# Patient Record
Sex: MALE | Race: OTHER | HISPANIC OR LATINO | URBAN - METROPOLITAN AREA
[De-identification: names, ages, dates, MRNs, and addresses within clinical notes are randomized per-mention and may not be internally consistent; named-entity substitution may affect disease eponyms.]

---

## 2020-08-20 ENCOUNTER — EMERGENCY (EMERGENCY)
Facility: HOSPITAL | Age: 35
LOS: 1 days | Discharge: HOME | End: 2020-08-20
Attending: STUDENT IN AN ORGANIZED HEALTH CARE EDUCATION/TRAINING PROGRAM
Payer: COMMERCIAL

## 2020-08-20 VITALS
HEIGHT: 69 IN | SYSTOLIC BLOOD PRESSURE: 135 MMHG | RESPIRATION RATE: 16 BRPM | HEART RATE: 79 BPM | OXYGEN SATURATION: 99 % | WEIGHT: 184.97 LBS | DIASTOLIC BLOOD PRESSURE: 81 MMHG | TEMPERATURE: 99 F

## 2020-08-20 VITALS
TEMPERATURE: 99 F | DIASTOLIC BLOOD PRESSURE: 86 MMHG | SYSTOLIC BLOOD PRESSURE: 136 MMHG | OXYGEN SATURATION: 100 % | HEART RATE: 88 BPM | RESPIRATION RATE: 16 BRPM

## 2020-08-20 DIAGNOSIS — Z98.89 OTHER SPECIFIED POSTPROCEDURAL STATES: Chronic | ICD-10-CM

## 2020-08-20 PROCEDURE — 73090 X-RAY EXAM OF FOREARM: CPT | Mod: 26,LT

## 2020-08-20 PROCEDURE — 73090 X-RAY EXAM OF FOREARM: CPT

## 2020-08-20 PROCEDURE — 73080 X-RAY EXAM OF ELBOW: CPT | Mod: 26,LT

## 2020-08-20 PROCEDURE — 73060 X-RAY EXAM OF HUMERUS: CPT

## 2020-08-20 PROCEDURE — 99284 EMERGENCY DEPT VISIT MOD MDM: CPT

## 2020-08-20 PROCEDURE — 73080 X-RAY EXAM OF ELBOW: CPT

## 2020-08-20 PROCEDURE — 73060 X-RAY EXAM OF HUMERUS: CPT | Mod: 26,LT

## 2020-08-20 RX ORDER — IBUPROFEN 200 MG
600 TABLET ORAL ONCE
Refills: 0 | Status: COMPLETED | OUTPATIENT
Start: 2020-08-20 | End: 2020-08-20

## 2020-08-20 RX ADMIN — Medication 600 MILLIGRAM(S): at 12:45

## 2020-08-20 NOTE — ED ADULT NURSE NOTE - CHPI ED NUR SYMPTOMS NEG
no stiffness/no fever/no numbness/no deformity/no difficulty bearing weight/no tingling/no bruising/no back pain/no abrasion

## 2020-08-20 NOTE — ED PROVIDER NOTE - CARE PROVIDER_API CALL
Lico Brown  ORTHOPAEDIC SURGERY  [address], [address]  [place] [address]  Phone: [phone]  Fax: [phone]  Follow Up Time:

## 2020-08-20 NOTE — ED PROVIDER NOTE - NSFOLLOWUPINSTRUCTIONS_ED_ALL_ED_FT
You were evaluated in the emergency department for arm pain.    There is concern for an injury to your bicep tendon.    You need to follow up with the orthopedist next week. Please call today to make an appointment to be seen. You were provided with information for Dr. Brown as well as the orthopedic clinic.    Please take Tylenol 650 mg every 4 hours as needed for pain. Please do not exceed more than 4,000mg of Tylenol in a day     Please take Motrin 600mg by mouth every 6 hours as needed for pain. Please take this medication with food.     You were provided with a copy of your test results.    Please wear your sling for comfort, but you should not wear it all the time as this can cause shoulder mobility problems.

## 2020-08-20 NOTE — ED PROVIDER NOTE - PHYSICAL EXAMINATION
Gen: NAD, AOx3  Head: NCAT  HEENT: PERRL, oral mucosa moist, normal conjunctiva  Lung: CTAB, no respiratory distress  CV: rrr, no murmurs, Normal perfusion  MSK: mild edema of left forearm, pain with extension of elbow otherwise full ROM of LUE, +equal radial pulses b/l  Neuro: No focal neurologic deficits, normal gait  Skin: No rash   Psych: normal affect

## 2020-08-20 NOTE — ED PROVIDER NOTE - NSFOLLOWUPCLINICS_GEN_ALL_ED_FT
[facility]  Orthopedic Surgery  [address], 3rd & 4th [place] [address]  Phone: [phone]  Fax:   Follow Up Time:

## 2020-08-20 NOTE — ED PROVIDER NOTE - OBJECTIVE STATEMENT
34M no pmh p/w left arm pain.  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  Feels like he tore something in his arm.  Pt has pain trying to fully extend the elbow.  Denies numbness/tingling.  No head trauma or other injuries.

## 2020-08-20 NOTE — ED PROVIDER NOTE - PROGRESS NOTE DETAILS
Dr. Bhupendra Ruiz, PGY-3: xrays w/o obvious deformity. Although limited 2/2 to pain pt does still have full ROM. Will DC w/ ortho follow up for workup of possible bicep tendon rupture vs. partial rupture. Explained plan to pt and he is amenable to this. Return precautions provided, pt verbalized understanding. Ready for DC.

## 2020-08-20 NOTE — ED PROVIDER NOTE - CLINICAL SUMMARY MEDICAL DECISION MAKING FREE TEXT BOX
36M p/w LUE arm pain after feeling like he "tore" something from lifting heavy dock.  Pain with elbow extension and slight edema of forearm.  Unclear if injury to bicep or forearm musculature.  Will check xray to eval for fx, give pain control and likely out patient ortho/sports follow up.  - Kathia Gutiérrez, DO 36M p/w LUE arm pain after feeling like he "tore" something from lifting heavy dock.  Pain with elbow extension and slight edema of forearm.  Likely injury of part of bicep  Will check xray to eval for fx, give pain control and likely out patient ortho/sports follow up.  - Kathia Gutiérrez, DO

## 2020-08-20 NOTE — ED ADULT NURSE NOTE - OBJECTIVE STATEMENT
35 y/o M pw c/o left bicep pain. States was working on a dock and lifted a heavy item and felt a snap in his bicep and states he feels like he tore something. denies any numbness/tingling, reports minor weakness in left arm.

## 2020-08-21 ENCOUNTER — APPOINTMENT (OUTPATIENT)
Dept: ORTHOPEDIC SURGERY | Facility: CLINIC | Age: 35
End: 2020-08-21
Payer: COMMERCIAL

## 2020-08-21 VITALS — TEMPERATURE: 97.2 F

## 2020-08-21 VITALS
HEIGHT: 71 IN | HEART RATE: 69 BPM | DIASTOLIC BLOOD PRESSURE: 67 MMHG | SYSTOLIC BLOOD PRESSURE: 118 MMHG | WEIGHT: 185 LBS | BODY MASS INDEX: 25.9 KG/M2

## 2020-08-21 DIAGNOSIS — Z80.42 FAMILY HISTORY OF MALIGNANT NEOPLASM OF PROSTATE: ICD-10-CM

## 2020-08-21 DIAGNOSIS — Z78.9 OTHER SPECIFIED HEALTH STATUS: ICD-10-CM

## 2020-08-21 DIAGNOSIS — Z60.2 PROBLEMS RELATED TO LIVING ALONE: ICD-10-CM

## 2020-08-21 PROCEDURE — 99203 OFFICE O/P NEW LOW 30 MIN: CPT

## 2020-08-21 SDOH — SOCIAL STABILITY - SOCIAL INSECURITY: PROBLEMS RELATED TO LIVING ALONE: Z60.2

## 2020-08-24 ENCOUNTER — APPOINTMENT (OUTPATIENT)
Dept: ORTHOPEDIC SURGERY | Facility: CLINIC | Age: 35
End: 2020-08-24
Payer: COMMERCIAL

## 2020-08-24 VITALS — SYSTOLIC BLOOD PRESSURE: 122 MMHG | HEART RATE: 70 BPM | DIASTOLIC BLOOD PRESSURE: 67 MMHG

## 2020-08-24 VITALS — TEMPERATURE: 97.3 F

## 2020-08-24 PROCEDURE — 99214 OFFICE O/P EST MOD 30 MIN: CPT

## 2020-08-26 ENCOUNTER — RESULT REVIEW (OUTPATIENT)
Age: 35
End: 2020-08-26

## 2020-08-26 ENCOUNTER — APPOINTMENT (OUTPATIENT)
Dept: MRI IMAGING | Facility: CLINIC | Age: 35
End: 2020-08-26
Payer: COMMERCIAL

## 2020-08-26 ENCOUNTER — OUTPATIENT (OUTPATIENT)
Dept: OUTPATIENT SERVICES | Facility: HOSPITAL | Age: 35
LOS: 1 days | End: 2020-08-26
Payer: COMMERCIAL

## 2020-08-26 DIAGNOSIS — Z98.89 OTHER SPECIFIED POSTPROCEDURAL STATES: Chronic | ICD-10-CM

## 2020-08-26 DIAGNOSIS — Z01.818 ENCOUNTER FOR OTHER PREPROCEDURAL EXAMINATION: ICD-10-CM

## 2020-08-26 DIAGNOSIS — Z00.8 ENCOUNTER FOR OTHER GENERAL EXAMINATION: ICD-10-CM

## 2020-08-26 PROCEDURE — 73221 MRI JOINT UPR EXTREM W/O DYE: CPT

## 2020-08-26 PROCEDURE — 73221 MRI JOINT UPR EXTREM W/O DYE: CPT | Mod: 26,LT

## 2020-08-26 NOTE — PHYSICAL EXAM
[de-identified] : ·	Oriented to time, place, person\par ·	Mood: Normal\par ·	Affect: Normal\par ·	Appearance: Healthy, well appearing, no acute distress.\par ·	Gait: Normal\par ·	Assistive Devices: None\par \par Left elbow exam\par \par ·	Skin: Clean, dry, intact.  Mild medial ecchymosis.  Mild antecubital swelling. No palpable joint effusion.\par ·	ROM: Limited terminal extension, full supination/pronation.  \par ·	Painful ROM: Pain with supination/extension\par ·	Tenderness: No medial epicondyle pain. No lateral epicondyle pain. No olecranon pain. No pain at radial head.  Tender to palpation of the distal biceps.\par ·	Strength: 4/5 elbow flexion, 5/5 elbow extension, 3/5 supination, 5/5 pronation\par ·	Stability: Stable to vaus/valgus stress\par ·	Vasc: 2+ radial pulse, <2s cap refill\par ·	Sensation: In tact to light touch throughout\par ·	Neuro: Negative tinels at ulnar canal, AIN/PIN/Ulnar nerve in tact to motor/sensation.\par  [de-identified] : Images were reviewed from ER dated 8.20.20. \par \par Multiple images left elbow showed no evidence of bony injury, or rogelio dislocation. There is no underlying degenerative arthritic change seen. Overall alignment is maintained. Otherwise unremarkable.

## 2020-08-26 NOTE — HISTORY OF PRESENT ILLNESS
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 He states that his friend lost his  and he was holding all the weight when he felt a pop in his elbow. He was evaluated in the ED at [facility] where xrays were negative for fracture. He followed up with sudarshan Carrasco with left distal biceps rupture and referred here for further treatment options.  He presents in a sling. He is not taking medication for this problem. \par \par The patient's past medical history, past surgical history, medications and allergies were reviewed by me today with the patient and documented accordingly. In addition, the patient's family and social history, which were noncontributory to this visit, were reviewed also.

## 2020-08-26 NOTE — DISCUSSION/SUMMARY
[de-identified] : 34-year-old male with left distal biceps tendon injury\par \par Patient presents with an acute injury to the left elbow consistent with complete distal biceps tendon avulsion. Discussed risk factors associated including hypervascularity of the tendon, intrinsic degeneration, and possible mechanical impingement. Mechanism of injury typically includes eccentric tension from a flexed to extended elbow position.\par \par History is consistent with an acute injury to the antecubital fossa, and physical exam suggests deformity to the biceps, loss of supination/flexion strength and positive hook test. I discussed the utility of MRI to distinguish degree of tendon retraction, and degree of tearing.\par \par Discussed treatment alternatives including nonoperative versus operative intervention. Acute operative intervention would repair the tendon back to the radial tuberosity to improve long-term function/strength. Nonoperative management would provide possible functional deficits including loss of 50% of supination strength, 30% of elbow flexion strength and possibly some  strength.\par \par The patient is electing for potential surgical management, so an MRI will be obtained to confirm full-thickness injury.  We will further discuss surgical treatment following MRI.

## 2020-08-28 ENCOUNTER — APPOINTMENT (OUTPATIENT)
Dept: DISASTER EMERGENCY | Facility: CLINIC | Age: 35
End: 2020-08-28

## 2020-08-29 LAB — SARS-COV-2 N GENE NPH QL NAA+PROBE: NOT DETECTED

## 2020-08-31 ENCOUNTER — OUTPATIENT (OUTPATIENT)
Dept: OUTPATIENT SERVICES | Facility: HOSPITAL | Age: 35
LOS: 1 days | End: 2020-08-31

## 2020-08-31 VITALS
HEIGHT: 70 IN | DIASTOLIC BLOOD PRESSURE: 70 MMHG | WEIGHT: 179.9 LBS | OXYGEN SATURATION: 98 % | RESPIRATION RATE: 16 BRPM | TEMPERATURE: 98 F | SYSTOLIC BLOOD PRESSURE: 100 MMHG | HEART RATE: 87 BPM

## 2020-08-31 DIAGNOSIS — Z98.89 OTHER SPECIFIED POSTPROCEDURAL STATES: Chronic | ICD-10-CM

## 2020-08-31 DIAGNOSIS — S46.212A STRAIN OF MUSCLE, FASCIA AND TENDON OF OTHER PARTS OF BICEPS, LEFT ARM, INITIAL ENCOUNTER: ICD-10-CM

## 2020-08-31 DIAGNOSIS — K21.9 GASTRO-ESOPHAGEAL REFLUX DISEASE WITHOUT ESOPHAGITIS: ICD-10-CM

## 2020-08-31 DIAGNOSIS — M54.2 CERVICALGIA: ICD-10-CM

## 2020-08-31 DIAGNOSIS — S46.219A STRAIN OF MUSCLE, FASCIA AND TENDON OF OTHER PARTS OF BICEPS, UNSPECIFIED ARM, INITIAL ENCOUNTER: ICD-10-CM

## 2020-08-31 LAB
ANION GAP SERPL CALC-SCNC: 16 MMO/L — HIGH (ref 7–14)
BUN SERPL-MCNC: 17 MG/DL — SIGNIFICANT CHANGE UP (ref 7–23)
CALCIUM SERPL-MCNC: 9.9 MG/DL — SIGNIFICANT CHANGE UP (ref 8.4–10.5)
CHLORIDE SERPL-SCNC: 101 MMOL/L — SIGNIFICANT CHANGE UP (ref 98–107)
CO2 SERPL-SCNC: 24 MMOL/L — SIGNIFICANT CHANGE UP (ref 22–31)
CREAT SERPL-MCNC: 1.03 MG/DL — SIGNIFICANT CHANGE UP (ref 0.5–1.3)
GLUCOSE SERPL-MCNC: 76 MG/DL — SIGNIFICANT CHANGE UP (ref 70–99)
HCT VFR BLD CALC: 45.1 % — SIGNIFICANT CHANGE UP (ref 39–50)
HGB BLD-MCNC: 15.3 G/DL — SIGNIFICANT CHANGE UP (ref 13–17)
MCHC RBC-ENTMCNC: 29 PG — SIGNIFICANT CHANGE UP (ref 27–34)
MCHC RBC-ENTMCNC: 33.9 % — SIGNIFICANT CHANGE UP (ref 32–36)
MCV RBC AUTO: 85.6 FL — SIGNIFICANT CHANGE UP (ref 80–100)
NRBC # FLD: 0 K/UL — SIGNIFICANT CHANGE UP (ref 0–0)
PLATELET # BLD AUTO: 253 K/UL — SIGNIFICANT CHANGE UP (ref 150–400)
PMV BLD: 10.5 FL — SIGNIFICANT CHANGE UP (ref 7–13)
POTASSIUM SERPL-MCNC: 3.9 MMOL/L — SIGNIFICANT CHANGE UP (ref 3.5–5.3)
POTASSIUM SERPL-SCNC: 3.9 MMOL/L — SIGNIFICANT CHANGE UP (ref 3.5–5.3)
RBC # BLD: 5.27 M/UL — SIGNIFICANT CHANGE UP (ref 4.2–5.8)
RBC # FLD: 12.5 % — SIGNIFICANT CHANGE UP (ref 10.3–14.5)
SODIUM SERPL-SCNC: 141 MMOL/L — SIGNIFICANT CHANGE UP (ref 135–145)
WBC # BLD: 7.53 K/UL — SIGNIFICANT CHANGE UP (ref 3.8–10.5)
WBC # FLD AUTO: 7.53 K/UL — SIGNIFICANT CHANGE UP (ref 3.8–10.5)

## 2020-08-31 RX ORDER — OXYCODONE AND ACETAMINOPHEN 5; 325 MG/1; MG/1
5-325 TABLET ORAL
Qty: 30 | Refills: 0 | Status: ACTIVE | COMMUNITY
Start: 2020-08-31 | End: 1900-01-01

## 2020-08-31 NOTE — H&P PST ADULT - NSICDXPASTSURGICALHX_GEN_ALL_CORE_FT
PAST SURGICAL HISTORY:  History of hernia surgery [facility] approximately 2007    S/P wrist surgery ORIF Right Wrist 2005

## 2020-08-31 NOTE — H&P PST ADULT - NSICDXPROBLEM_GEN_ALL_CORE_FT
PROBLEM DIAGNOSES  Problem: Strain of muscle, fascia and tendon of other parts of biceps, unspecified arm, initial encounter  Assessment and Plan: Left Elbow Distal Biceps Tendon repair  Pre op instructions reviewed with pt ; pt erbalized good understanding of pre op instructions    Problem: Neck pain  Assessment and Plan: Request recent studies PROBLEM DIAGNOSES  Problem: Strain of muscle, fascia and tendon of other parts of biceps, unspecified arm, initial encounter  Assessment and Plan: Left Elbow Distal Biceps Tendon repair  Pre op instructions reviewed with pt ; pt verbalized good understanding of pre op instructions    Problem: Neck pain  Assessment and Plan: Request recent studies Dr Alvarado

## 2020-08-31 NOTE — H&P PST ADULT - NSICDXPASTMEDICALHX_GEN_ALL_CORE_FT
PAST MEDICAL HISTORY:  Neck pain tx by chiropractor last 2 weeks ago ; s/p Xray necl 2020 PAST MEDICAL HISTORY:  GERD (gastroesophageal reflux disease)     Neck pain tx by chiropractor last 2 weeks ago ; s/p Xray cervical spine 2020

## 2020-08-31 NOTE — H&P PST ADULT - HISTORY OF PRESENT ILLNESS
Pt is a 34 y.o. male ; with h/o injury to left distal biceps tendon 8/2020 when lifting a dock. Pt reports pop of left elbow. Pt to ER @ [facility]. Pt referred to surgeon ; pt now presents for Left Elbow Distal Biceps Tendon Repair

## 2020-08-31 NOTE — H&P PST ADULT - NEGATIVE NEUROLOGICAL SYMPTOMS
no focal seizures/no tremors/no transient paralysis/no syncope/no weakness/no paresthesias/no generalized seizures

## 2020-09-01 ENCOUNTER — TRANSCRIPTION ENCOUNTER (OUTPATIENT)
Age: 35
End: 2020-09-01

## 2020-09-01 VITALS
DIASTOLIC BLOOD PRESSURE: 76 MMHG | SYSTOLIC BLOOD PRESSURE: 115 MMHG | OXYGEN SATURATION: 100 % | HEIGHT: 70 IN | TEMPERATURE: 97 F | RESPIRATION RATE: 16 BRPM | WEIGHT: 179.9 LBS | HEART RATE: 72 BPM

## 2020-09-01 RX ORDER — SODIUM CHLORIDE 9 MG/ML
1000 INJECTION, SOLUTION INTRAVENOUS
Refills: 0 | Status: DISCONTINUED | OUTPATIENT
Start: 2020-09-02 | End: 2020-09-17

## 2020-09-01 NOTE — ASU DISCHARGE PLAN (ADULT/PEDIATRIC) - CARE PROVIDER_API CALL
Sam Zamora  ORTHOPEDICS  [address]  [place] [address]  Phone: [phone]  Fax: [phone]  Follow Up Time:

## 2020-09-01 NOTE — ASU DISCHARGE PLAN (ADULT/PEDIATRIC) - NURSING INSTRUCTIONS
DO NOT take any Tylenol (Acetaminophen) or narcotics containing Tylenol until after 10:15pm. You received Tylenol during your operation and it can cause damage to your liver if too much is taken within a 24 hour time period.

## 2020-09-02 ENCOUNTER — APPOINTMENT (OUTPATIENT)
Dept: ORTHOPEDIC SURGERY | Facility: AMBULATORY SURGERY CENTER | Age: 35
End: 2020-09-02

## 2020-09-02 ENCOUNTER — OUTPATIENT (OUTPATIENT)
Dept: OUTPATIENT SERVICES | Facility: HOSPITAL | Age: 35
LOS: 1 days | Discharge: HOME | End: 2020-09-02
Payer: COMMERCIAL

## 2020-09-02 VITALS
TEMPERATURE: 98 F | SYSTOLIC BLOOD PRESSURE: 115 MMHG | RESPIRATION RATE: 16 BRPM | HEART RATE: 71 BPM | DIASTOLIC BLOOD PRESSURE: 56 MMHG | OXYGEN SATURATION: 99 %

## 2020-09-02 DIAGNOSIS — Z98.89 OTHER SPECIFIED POSTPROCEDURAL STATES: Chronic | ICD-10-CM

## 2020-09-02 DIAGNOSIS — S46.212A STRAIN OF MUSCLE, FASCIA AND TENDON OF OTHER PARTS OF BICEPS, LEFT ARM, INITIAL ENCOUNTER: ICD-10-CM

## 2020-09-02 PROCEDURE — 24342 REPAIR OF RUPTURED TENDON: CPT | Mod: LT

## 2020-09-02 RX ORDER — ONDANSETRON 4 MG/1
4 TABLET ORAL
Qty: 20 | Refills: 0 | Status: ACTIVE | COMMUNITY
Start: 2020-09-02 | End: 1900-01-01

## 2020-09-14 ENCOUNTER — APPOINTMENT (OUTPATIENT)
Dept: ORTHOPEDIC SURGERY | Facility: CLINIC | Age: 35
End: 2020-09-14
Payer: COMMERCIAL

## 2020-09-14 VITALS — TEMPERATURE: 98.4 F

## 2020-09-14 PROCEDURE — 99024 POSTOP FOLLOW-UP VISIT: CPT

## 2020-09-14 PROCEDURE — 73080 X-RAY EXAM OF ELBOW: CPT | Mod: LT

## 2020-09-14 NOTE — HISTORY OF PRESENT ILLNESS
[2] : the patient reports pain that is 2/10 in severity [Nausea] : no nausea [Chills] : no chills [Fever] : no fever [Clean/Dry/Intact] : clean, dry and intact [Vomiting] : no vomiting [Discharge] : absent of discharge [Erythema] : not erythematous [Healed] : not healed [Swelling] : not swollen [Dehiscence] : not dehisced [Neuro Intact] : an unremarkable neurological exam [Doing Well] : is doing well [Vascular Intact] : ~T peripheral vascular exam normal [Excellent Pain Control] : has excellent pain control [No Sign of Infection] : is showing no signs of infection [Steri-Strips Removed & Replaced] : steri-strips removed and replaced [Sutures Removed] : sutures were removed [de-identified] : Left elbow exam\par \par Skin: Clean, dry, intact.  Mild ecchymosis.  Mild swelling. No palpable joint effusion.\par ROM: Mild pain with terminal extension, mild limitation with supination/pronation.  \par Painful ROM: None\par Tenderness: Tender to palpation antecubital fossa.\par Strength: Not tested \par Stability: Stable\par Vasc: 2+ radial pulse, <2s cap refill\par Sensation: In tact to light touch throughout, mild paresthesias distal forearm\par Neuro: AIN/PIN/Ulnar nerve in tact to motor/sensation.\par  [de-identified] : 34-year-old male status post left distal biceps tendon repair. He is doing well resting comfortably in a splint and sling. He is not taking pain medication.  Denies post op complications.  [de-identified] : 34-year-old male status post left distal biceps tendon repair 9/2/20 [de-identified] : \par The following radiographs were ordered and read by me during this patients visit. I reviewed each radiograph in detail with the patient and discussed the findings as highlighted below. \par \par 3 views of the left elbow were obtained today that show interval change of anatomic distal biceps tendon repair. [de-identified] : 34-year-old male status post left distal biceps tendon repair\par \par Patient is doing well following left distal biceps tendon repair.  Patient was placed into a hinged elbow brace.  Patient has good active and passive range of motion with minimal to no pain.  Mild paresthesias within the forearm.  Discussed that this will likely resolve with time.\par \par Recommendation: PT as per protocol, hinged brace x6 weeks, ice/NSAIDs.\par \par Follow-up 1 month

## 2020-09-18 PROBLEM — K21.9 GASTRO-ESOPHAGEAL REFLUX DISEASE WITHOUT ESOPHAGITIS: Chronic | Status: ACTIVE | Noted: 2020-08-31

## 2020-09-18 PROBLEM — M54.2 CERVICALGIA: Chronic | Status: ACTIVE | Noted: 2020-08-31

## 2020-10-12 ENCOUNTER — APPOINTMENT (OUTPATIENT)
Dept: ORTHOPEDIC SURGERY | Facility: CLINIC | Age: 35
End: 2020-10-12
Payer: COMMERCIAL

## 2020-10-12 VITALS — TEMPERATURE: 97.5 F

## 2020-10-12 DIAGNOSIS — S46.212D STRAIN OF MUSCLE, FASCIA AND TENDON OF OTHER PARTS OF BICEPS, LEFT ARM, SUBSEQUENT ENCOUNTER: ICD-10-CM

## 2020-10-12 PROCEDURE — 99024 POSTOP FOLLOW-UP VISIT: CPT

## 2020-10-12 NOTE — HISTORY OF PRESENT ILLNESS
[0] : no pain reported [Clean/Dry/Intact] : clean, dry and intact [Neuro Intact] : an unremarkable neurological exam [Vascular Intact] : ~T peripheral vascular exam normal [Doing Well] : is doing well [Excellent Pain Control] : has excellent pain control [No Sign of Infection] : is showing no signs of infection [Chills] : no chills [Fever] : no fever [Nausea] : no nausea [Vomiting] : no vomiting [Healed] : not healed [Erythema] : not erythematous [Discharge] : absent of discharge [Swelling] : not swollen [Dehiscence] : not dehisced [de-identified] : 34-year-old male status post left distal biceps tendon repair 9/2/20 [de-identified] : 34-year-old male status post left distal biceps tendon repair. He  presents in post op brace. Attending PT 3 x per week. He is able to lift 3 lbs without pain.  He is not taking pain medication.  Denies post op complications.  [de-identified] : Left elbow exam\par \par Skin: Clean, dry, intact. \par ROM: Full flexion/extension, Full supination/pronation.  \par Painful ROM: Pain with supination\par Tenderness: None\par Strength: 4/5 sup/flex\par Stability: Stable\par Vasc: 2+ radial pulse, <2s cap refill\par Sensation: In tact to light touch throughout\par Neuro: AIN/PIN/Ulnar nerve in tact to motor/sensation.\par  [de-identified] : 34-year-old male status post left distal biceps tendon repair\par \par Patient is doing well following left distal biceps tendon repair. Patient has good active and passive range of motion with minimal to no pain.  Mild paresthesias within the forearm.  Discussed that this will likely resolve with time.  Progressing well with current physical therapy program.\par \par Recommendation: PT as per protocol, discontinue hinged brace, ice/NSAIDs.\par \par Follow-up 6wks

## 2020-10-12 NOTE — ADDENDUM
[FreeTextEntry1] : This note was written by Annita Mas on 10/12/2020 acting solely as a scribe for Dr. Sam Zamora.\par \par All medical record entries made by the Scribe were at my, Dr. Sam Zamora, direction and personally dictated by me on 10/12/2020. I have personally reviewed the chart and agree that the record accurately reflects my personal performance of the history, physical exam, assessment and plan.

## 2020-11-23 ENCOUNTER — APPOINTMENT (OUTPATIENT)
Dept: ORTHOPEDIC SURGERY | Facility: CLINIC | Age: 35
End: 2020-11-23

## 2022-08-09 RX ORDER — OMEPRAZOLE AND SODIUM BICARBONATE 40; 1100 MG/1; MG/1
1 CAPSULE, GELATIN COATED ORAL
Qty: 0 | Refills: 0 | DISCHARGE

## 2023-06-29 NOTE — ED PROVIDER NOTE - MUSCULOSKELETAL [-], MLM
no neck pain/no back pain Thalidomide Counseling: I discussed with the patient the risks of thalidomide including but not limited to birth defects, anxiety, weakness, chest pain, dizziness, cough and severe allergy.

## 2023-10-02 ENCOUNTER — NON-APPOINTMENT (OUTPATIENT)
Age: 38
End: 2023-10-02

## 2024-02-09 ENCOUNTER — NON-APPOINTMENT (OUTPATIENT)
Age: 39
End: 2024-02-09

## 2024-04-03 ENCOUNTER — NON-APPOINTMENT (OUTPATIENT)
Age: 39
End: 2024-04-03

## 2024-10-29 ENCOUNTER — NON-APPOINTMENT (OUTPATIENT)
Age: 39
End: 2024-10-29

## 2024-11-06 NOTE — ED PROVIDER NOTE - PATIENT PORTAL LINK FT
Update History & Physical    The patient's History and Physical of  was reviewed with the patient and I examined the patient. There was no change. The surgical site was confirmed by the patient and me.     Plan: The risks, benefits, expected outcome, and alternative to the recommended procedure have been discussed with the patient. Patient understands and wants to proceed with the procedure.     Electronically signed by ANASTACIO COTO MD on 11/6/2024 at 7:27 AM       You can access the FollowMyHealth Patient Portal offered by [facility] by registering at the following website: [email or web address]. By joining [employer]’s FollowMyHealth portal, you will also be able to view your health information using other applications (apps) compatible with our system.

## 2025-04-28 NOTE — ED ADULT NURSE NOTE - NSFALLRSKPASTHIST_ED_ALL_ED
----- Message from Denton Figueroa sent at 4/22/2025  3:11 PM CDT -----  Good afternoon,    Could you please schedule a 10 min video followup visit with me next week?    Thanks  KW   no